# Patient Record
Sex: FEMALE | Race: WHITE
[De-identification: names, ages, dates, MRNs, and addresses within clinical notes are randomized per-mention and may not be internally consistent; named-entity substitution may affect disease eponyms.]

---

## 2022-11-03 ENCOUNTER — HOSPITAL ENCOUNTER (EMERGENCY)
Dept: HOSPITAL 26 - MED | Age: 42
Discharge: HOME | End: 2022-11-03
Payer: MEDICAID

## 2022-11-03 VITALS — SYSTOLIC BLOOD PRESSURE: 127 MMHG | DIASTOLIC BLOOD PRESSURE: 83 MMHG

## 2022-11-03 VITALS — SYSTOLIC BLOOD PRESSURE: 109 MMHG | DIASTOLIC BLOOD PRESSURE: 67 MMHG

## 2022-11-03 VITALS — BODY MASS INDEX: 22.15 KG/M2 | HEIGHT: 67 IN | WEIGHT: 141.12 LBS

## 2022-11-03 DIAGNOSIS — Z88.6: ICD-10-CM

## 2022-11-03 DIAGNOSIS — J06.9: Primary | ICD-10-CM

## 2022-11-03 DIAGNOSIS — Z20.822: ICD-10-CM

## 2022-11-03 DIAGNOSIS — Z79.899: ICD-10-CM

## 2022-11-03 DIAGNOSIS — J40: ICD-10-CM

## 2022-11-03 PROCEDURE — 99285 EMERGENCY DEPT VISIT HI MDM: CPT

## 2022-11-03 PROCEDURE — 93005 ELECTROCARDIOGRAM TRACING: CPT

## 2022-11-03 PROCEDURE — 87426 SARSCOV CORONAVIRUS AG IA: CPT

## 2022-11-03 PROCEDURE — 87804 INFLUENZA ASSAY W/OPTIC: CPT

## 2022-11-03 PROCEDURE — 71045 X-RAY EXAM CHEST 1 VIEW: CPT

## 2022-11-03 NOTE — NUR
42/F PRESENTS TO ED WITH C/O SOB, CHEST PRESSURE, CHILLS, COUGH AND SORE THROAT 
X3 DAYS. PATIENT REPORTS TAKING TYLENOL WITH MILD RELIEF, LAST DOSE TODAY AT 
6AM, PATIENT REPORTS SHE WAS TESTED FOR COVID 3 DAYS AGO AND WAS NEGATIVE. 
PATIENT DENIES FEVERS, N/V/D OR URINARY SYMPTOMS, O2 SATURATION 95% IN TRIAGE.

## 2022-11-03 NOTE — NUR
Patient discharged with v/s stable. Written and verbal after care instructions 
ABOUT URI AND ACUTE BRONCHITIS given and explained. Patient alert, oriented and 
verbalized understanding of instructions. Ambulatory with steady gait. All 
questions addressed prior to discharge. ID band removed. Patient advised to 
follow up with PMD. Rx of BENZONATATE, DELTASONE AND PROVENTIL given. Patient 
educated on indication of medication including possible reaction and side 
effects. Opportunity to ask questions provided and answered.